# Patient Record
Sex: FEMALE | Race: WHITE
[De-identification: names, ages, dates, MRNs, and addresses within clinical notes are randomized per-mention and may not be internally consistent; named-entity substitution may affect disease eponyms.]

---

## 2019-02-01 PROBLEM — Z00.00 ENCOUNTER FOR PREVENTIVE HEALTH EXAMINATION: Status: ACTIVE | Noted: 2019-02-01

## 2019-02-11 ENCOUNTER — APPOINTMENT (OUTPATIENT)
Dept: DERMATOLOGY | Facility: CLINIC | Age: 29
End: 2019-02-11
Payer: COMMERCIAL

## 2019-02-11 VITALS — BODY MASS INDEX: 23.32 KG/M2 | HEIGHT: 65 IN | WEIGHT: 140 LBS

## 2019-02-11 DIAGNOSIS — Z87.891 PERSONAL HISTORY OF NICOTINE DEPENDENCE: ICD-10-CM

## 2019-02-11 DIAGNOSIS — L71.9 ROSACEA, UNSPECIFIED: ICD-10-CM

## 2019-02-11 PROCEDURE — 99203 OFFICE O/P NEW LOW 30 MIN: CPT | Mod: 25

## 2019-02-11 PROCEDURE — 11103 TANGNTL BX SKIN EA SEP/ADDL: CPT

## 2019-02-11 PROCEDURE — 11102 TANGNTL BX SKIN SINGLE LES: CPT

## 2019-02-11 NOTE — ASSESSMENT
[FreeTextEntry1] : 1) skin check-\par -benign findings as above\par \par 2) rosacea-\par -education\par -tx options discussed\par -doxycycline 100 mg PO daily x1 month; SED\par -consider PDL for 5 discrete lesions at RPP \par \par 3) Shave bx location x2: 1) upper back, 2) lower back\par diagnosis: rule out BN vs. DN for both locations\par  \par Shave biopsy performed today over above locations, risks and benefits discussed including incomplete removal, not enough tissue for diagnosis scarring and infection, informed consent obtained, pictures taken,  cleaned with alcohol and anesthetized with 1%lido+epi, 0.3 cc total, hemostasis obtained with monsels, vaseline and bandaid placed, tolerated well, wound care reviewed, specimen sent to pathology.\par \par

## 2019-02-11 NOTE — PHYSICAL EXAM
[FreeTextEntry3] : PE: General: well-nourished, well-developed, nad, aaox3\par Full body skin exam performed examining scalp, head, face, ears, eyes, mouth,\par neck, chest, back, abdomen, axilla, buttock, groin, b/l arms, b/l\par forearms, b/l hands, b/l fingernails, b/l thighs, b/l legs, b/l feet, b/l\par toenails.  Pertinent findings include:\par \par Normal findings include:\par \par Seborrheic keratoses\par Angiomas\par Lentigines\par \par face with mild erythema and few papules\par \par upper back with raised papule\par lower back with hyperpigmented macule

## 2019-02-11 NOTE — HISTORY OF PRESENT ILLNESS
[FreeTextEntry1] : red spots, moles [de-identified] : patient here with red spots and bumps on face. has used OTC treatments with minimal benefit.\par also would like moles examined. has hx of tanning bed use.

## 2019-02-21 LAB — CORE LAB BIOPSY: NORMAL

## 2019-03-06 ENCOUNTER — APPOINTMENT (OUTPATIENT)
Dept: DERMATOLOGY | Facility: CLINIC | Age: 29
End: 2019-03-06
Payer: COMMERCIAL

## 2019-03-06 PROCEDURE — 11403 EXC TR-EXT B9+MARG 2.1-3CM: CPT

## 2019-03-06 PROCEDURE — 12032 INTMD RPR S/A/T/EXT 2.6-7.5: CPT

## 2019-03-11 ENCOUNTER — MOBILE ON CALL (OUTPATIENT)
Age: 29
End: 2019-03-11

## 2019-03-12 LAB — CORE LAB BIOPSY: NORMAL

## 2019-03-20 ENCOUNTER — APPOINTMENT (OUTPATIENT)
Dept: DERMATOLOGY | Facility: CLINIC | Age: 29
End: 2019-03-20
Payer: COMMERCIAL

## 2019-03-20 DIAGNOSIS — D23.5 OTHER BENIGN NEOPLASM OF SKIN OF TRUNK: ICD-10-CM

## 2019-03-20 DIAGNOSIS — L91.8 OTHER HYPERTROPHIC DISORDERS OF THE SKIN: ICD-10-CM

## 2019-03-20 PROCEDURE — 99213 OFFICE O/P EST LOW 20 MIN: CPT

## 2019-03-20 NOTE — PHYSICAL EXAM
[FreeTextEntry3] : AAOx3, pleasant, NAD, no visual lymphadenopathy\par hair, scalp, face, nose, eyelids, ears, lips, oropharynx, neck, chest, abdomen, back, right arm, left arm, nails, and hands examined with all normal findings,\par pertinent findings include:\par \par 2 skin tags on back\par well healing excision site with some pus

## 2019-03-20 NOTE — HISTORY OF PRESENT ILLNESS
[FreeTextEntry1] : suture removal, growths [de-identified] : patient here for suture removal; healing well. went skiing and has minor pus\par also 2 growths on back

## 2019-04-08 NOTE — ASSESSMENT
[FreeTextEntry1] : 1) excision-\par -sutrures removed\par -doxy 100 mg PO BID x3 days; SED\par -steri strips placed\par \par 2) skin tags x2- removed [facility]

## 2020-01-24 ENCOUNTER — APPOINTMENT (OUTPATIENT)
Dept: DERMATOLOGY | Facility: CLINIC | Age: 30
End: 2020-01-24
Payer: COMMERCIAL

## 2020-01-24 VITALS — HEIGHT: 65 IN

## 2020-01-24 DIAGNOSIS — L81.4 OTHER MELANIN HYPERPIGMENTATION: ICD-10-CM

## 2020-01-24 DIAGNOSIS — D22.9 MELANOCYTIC NEVI, UNSPECIFIED: ICD-10-CM

## 2020-01-24 DIAGNOSIS — D48.9 NEOPLASM OF UNCERTAIN BEHAVIOR, UNSPECIFIED: ICD-10-CM

## 2020-01-24 DIAGNOSIS — Z12.83 ENCOUNTER FOR SCREENING FOR MALIGNANT NEOPLASM OF SKIN: ICD-10-CM

## 2020-01-24 PROCEDURE — 11102 TANGNTL BX SKIN SINGLE LES: CPT

## 2020-01-24 PROCEDURE — 99213 OFFICE O/P EST LOW 20 MIN: CPT | Mod: 25

## 2020-01-31 LAB — CORE LAB BIOPSY: NORMAL

## 2021-06-02 ENCOUNTER — TRANSCRIPTION ENCOUNTER (OUTPATIENT)
Age: 31
End: 2021-06-02

## 2022-04-08 ENCOUNTER — APPOINTMENT (OUTPATIENT)
Dept: ORTHOPEDIC SURGERY | Facility: CLINIC | Age: 32
End: 2022-04-08
Payer: COMMERCIAL

## 2022-04-08 VITALS — WEIGHT: 140 LBS | HEIGHT: 65 IN | BODY MASS INDEX: 23.32 KG/M2

## 2022-04-08 DIAGNOSIS — M94.261 CHONDROMALACIA, RIGHT KNEE: ICD-10-CM

## 2022-04-08 DIAGNOSIS — Z78.9 OTHER SPECIFIED HEALTH STATUS: ICD-10-CM

## 2022-04-08 PROCEDURE — 99024 POSTOP FOLLOW-UP VISIT: CPT

## 2022-04-08 NOTE — PHYSICAL EXAM
[Right] : right knee [] : extensor lag [[id number]] : flexion 120 degrees [de-identified] : extension 7 degrees

## 2022-04-08 NOTE — HISTORY OF PRESENT ILLNESS
[Gradual] : gradual [8] : 8 [2] : 2 [Throbbing] : throbbing [Intermittent] : intermittent [Household chores] : household chores [Leisure] : leisure [Work] : work [Sleep] : sleep [Social interactions] : social interactions [Rest] : rest [Full time] : Work status: full time [de-identified] : Patient had right knee scope on 4/1/2022 [] : no [FreeTextEntry1] : Right knee  [FreeTextEntry5] : Patient states NKI  [de-identified] : Movement  [de-identified] : 4/1/2022 [de-identified] : MRI, X-ray  [de-identified] : SX [de-identified] :   [de-identified] : 4/1/2022 [de-identified] : Knee arthroscopy

## 2022-05-09 ENCOUNTER — APPOINTMENT (OUTPATIENT)
Dept: ORTHOPEDIC SURGERY | Facility: CLINIC | Age: 32
End: 2022-05-09
Payer: COMMERCIAL

## 2022-05-09 DIAGNOSIS — Z98.890 OTHER SPECIFIED POSTPROCEDURAL STATES: ICD-10-CM

## 2022-05-09 PROCEDURE — 99024 POSTOP FOLLOW-UP VISIT: CPT

## 2022-05-09 RX ORDER — DOXYCYCLINE HYCLATE 100 MG/1
100 TABLET ORAL DAILY
Qty: 30 | Refills: 3 | Status: DISCONTINUED | COMMUNITY
Start: 2019-02-11 | End: 2022-05-09

## 2022-05-09 RX ORDER — DOXYCYCLINE HYCLATE 100 MG/1
100 TABLET ORAL
Qty: 6 | Refills: 0 | Status: DISCONTINUED | COMMUNITY
Start: 2019-03-20 | End: 2022-05-09

## 2022-05-09 NOTE — PHYSICAL EXAM
[Right] : right knee [NL (140)] : flexion 140 degrees [NL (0)] : extension 0 degrees [5___] : hamstring 5[unfilled]/5 [] : not mildly antalgic

## 2022-05-09 NOTE — HISTORY OF PRESENT ILLNESS
[de-identified] : following up for the right knee. Patient is s/p right knee scope 4/01/2022. Patient states the knee has improved.  She notes some pain anterior aspect of knee.  She states her pre op has resolved. She is working with PT and progressing well. Denies any fever chills  or drainage from the knee.

## 2022-07-11 ENCOUNTER — APPOINTMENT (OUTPATIENT)
Dept: ORTHOPEDIC SURGERY | Facility: CLINIC | Age: 32
End: 2022-07-11

## 2022-11-21 ENCOUNTER — FORM ENCOUNTER (OUTPATIENT)
Age: 32
End: 2022-11-21
